# Patient Record
Sex: MALE | Race: BLACK OR AFRICAN AMERICAN
[De-identification: names, ages, dates, MRNs, and addresses within clinical notes are randomized per-mention and may not be internally consistent; named-entity substitution may affect disease eponyms.]

---

## 2019-07-14 ENCOUNTER — HOSPITAL ENCOUNTER (EMERGENCY)
Dept: HOSPITAL 62 - ER | Age: 29
LOS: 1 days | Discharge: HOME | End: 2019-07-15
Payer: SELF-PAY

## 2019-07-14 DIAGNOSIS — S21.112A: ICD-10-CM

## 2019-07-14 DIAGNOSIS — S51.812A: Primary | ICD-10-CM

## 2019-07-14 DIAGNOSIS — S61.512A: ICD-10-CM

## 2019-07-14 DIAGNOSIS — S31.114A: ICD-10-CM
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
## 2019-07-14 DIAGNOSIS — S41.112A: ICD-10-CM

## 2019-07-14 PROCEDURE — 12005 RPR S/N/A/GEN/TRK12.6-20.0CM: CPT

## 2019-07-14 PROCEDURE — 99282 EMERGENCY DEPT VISIT SF MDM: CPT

## 2019-07-15 VITALS — DIASTOLIC BLOOD PRESSURE: 58 MMHG | SYSTOLIC BLOOD PRESSURE: 121 MMHG
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
## 2019-07-15 NOTE — ER DOCUMENT REPORT
Procedures





- Laceration/Wound Repair


  ** Distal Left FA


Wound length (cm): 5


Wound's Depth, Shape: Superficial, Irregular


Laceration pre-procedure: Sterile PPE donned


Anesthetic type: 1% Lidocaine


Wound explored: Clean


Wound Debrided: Minimal


Wound Repaired With: Sutures


Suture Size/Type: 4:0


Number of Sutures: 8


Post-procedure NV exam normal: Yes


Complications: No





  ** Mid Left FA


Wound length (cm): 4


Wound's Depth, Shape: Superficial


Laceration pre-procedure: Sterile PPE donned


Anesthetic type: 1% Lidocaine


Wound explored: Clean


Wound Repaired With: Sutures


Suture Size/Type: 4:0


Number of Sutures: 7


Post-procedure NV exam normal: Yes


Complications: No





  ** Upper L Arm


Wound length (cm): 2


Wound's Depth, Shape: Superficial


Laceration pre-procedure: Sterile PPE donned


Anesthetic type: 1% Lidocaine


Wound explored: Clean


Wound Debrided: Minimal


Wound Repaired With: Sutures


Suture Size/Type: 4:0


Number of Sutures: 2


Post-procedure NV exam normal: Yes


Complications: No





  ** Left abdomen


Wound length (cm): 4


Wound's Depth, Shape: Superficial


Laceration pre-procedure: Sterile PPE donned


Anesthetic type: 1% Lidocaine


Wound explored: Clean


Wound Debrided: Minimal


Wound Repaired With: Sutures


Suture Size/Type: 4:0


Number of Sutures: 4


Post-procedure NV exam normal: Yes


Complications: No





  ** Misc Mid L arm lacerations x3


Wound length (cm): 3


Wound's Depth, Shape: Superficial


Laceration pre-procedure: Sterile PPE donned


Anesthetic type: 1% Lidocaine


Wound Repaired With: Dermabond


Complications: No

## 2019-07-23 ENCOUNTER — HOSPITAL ENCOUNTER (EMERGENCY)
Dept: HOSPITAL 62 - ER | Age: 29
Discharge: HOME | End: 2019-07-23
Payer: SELF-PAY

## 2019-07-23 VITALS — SYSTOLIC BLOOD PRESSURE: 132 MMHG | DIASTOLIC BLOOD PRESSURE: 77 MMHG

## 2019-07-23 DIAGNOSIS — L08.9: Primary | ICD-10-CM
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
## 2019-07-23 DIAGNOSIS — S51.812A: ICD-10-CM

## 2019-07-23 PROCEDURE — 99282 EMERGENCY DEPT VISIT SF MDM: CPT
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
## 2019-07-28 ENCOUNTER — HOSPITAL ENCOUNTER (EMERGENCY)
Dept: HOSPITAL 62 - ER | Age: 29
Discharge: HOME | End: 2019-07-28
Payer: SELF-PAY

## 2019-07-28 VITALS — SYSTOLIC BLOOD PRESSURE: 128 MMHG | DIASTOLIC BLOOD PRESSURE: 96 MMHG

## 2019-07-28 DIAGNOSIS — M53.3: Primary | ICD-10-CM

## 2019-07-28 DIAGNOSIS — M79.672: ICD-10-CM
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
## 2019-07-28 PROCEDURE — 72220 X-RAY EXAM SACRUM TAILBONE: CPT

## 2019-07-28 PROCEDURE — 99283 EMERGENCY DEPT VISIT LOW MDM: CPT

## 2019-07-28 NOTE — RADIOLOGY REPORT (SQ)
EXAM DESCRIPTION:  SACRUM AND COCCYX



COMPLETED DATE/TIME:  7/28/2019 10:52 am



REASON FOR STUDY:  pain/fall



COMPARISON:  None.



NUMBER OF VIEWS:  Three views.



TECHNIQUE:  AP, lateral, and tilt views of the sacrum and coccyx.



LIMITATIONS:  None.



FINDINGS:  MINERALIZATION: Normal.

BONES: No acute fracture or dislocation.  No worrisome bone lesions.

SOFT TISSUES: No soft tissue swelling.  No foreign body.

OTHER: No other significant finding.



IMPRESSION:  NEGATIVE STUDY OF THE SACRUM AND COCCYX.



TECHNICAL DOCUMENTATION:  JOB ID:  [id number]

 2011 [employer]- All Rights Reserved



Reading location - IP/workstation name: [email or web address]

## 2019-07-28 NOTE — ER DOCUMENT REPORT
HPI





- HPI


Patient complains to provider of: foot and buttocks pain


Time Seen by Provider: 07/28/19 10:15


Pain Level: 5


Context: 





Patient is otherwise healthy 29-year-old male presents to the emergency 

department for left foot pain as well as coccyx pain.  Patient states yesterday 

evening he actually tripped and fell down 2 stairs.  States he landed on the 

bottom of his left heel and on his buttocks.  Patient's denying hitting his 

head, neck, upper back.  Patient's denying any loss of consciousness or 

vomiting.  Patient is denying any numbness or tingling in any extremity.  

Patient's denying any loss of bowel or bladder or urinary retention.





Patient states his pain is mostly located in the heel of his left foot and right

on his tailbone.


Patient denies any pain in the lateral or medial left malleolus.





- CONSTITUTIONAL


Constitutional: DENIES: Fever, Chills





- MUSCULOSKELETAL


Musculoskeletal: REPORTS: Extremity pain - left ankle





Past Medical History





- General


Information source: Patient





- Social History


Smoking Status: Never Smoker


Chew tobacco use (# tins/day): No


Frequency of alcohol use: None


Drug Abuse: None


Family History: Reviewed & Not Pertinent


Patient has suicidal ideation: No


Patient has homicidal ideation: No


Renal/ Medical History: Denies: Hx Peritoneal Dialysis





Vertical Provider Document





- CONSTITUTIONAL


Agree With Documented VS: Yes


Notes: 





GENERAL: Alert, interacts well. No acute distress.


HEAD: Normocephalic, atraumatic.


EYES: Pupils equal, round, and reactive to light. Extraocular movements intact.


ENT: Oral mucosa moist, tongue midline. 


NECK: Full range of motion. Supple. Trachea midline.


LUNGS: Clear to auscultation bilaterally, no wheezes, rales, or rhonchi. No 

respiratory distress.


HEART: Regular rate and rhythm. No murmur


ABDOMEN: Soft, non-tender. Non-distended. Bowel sounds present in all 4 

quadrants.


EXTREMITIES: Moves all 4 extremities spontaneously. No edema, normal radial and 

dorsalis pedis pulses bilaterally. No cyanosis.  Generalized pains noted in the 

left heel, no erythema, no ecchymosis, no swelling noted.  Full range of motion 

left ankle, no left medial or lateral malleolus pain.  No left knee pain, no 

left hip pain. 


BACK: no cervical, thoracic, lumbar midline tenderness. No saddle anesthesia, 

normal distal neurovascular exam.  Generalized pain the patient's coccyx region.

 No erythema, no swelling, no ecchymosis noted.


NEUROLOGICAL: Alert and oriented x3. Normal speech. cranial nerves II through 

XII grossly intact 


PSYCH: Normal affect, normal mood.


SKIN: Warm, dry, normal turgor. No rashes or lesions noted.








- INFECTION CONTROL


TRAVEL OUTSIDE OF THE [place] IN LAST 30 DAYS: No





Course





- Re-evaluation


Re-evalutation: 





07/28/19 11:10


                                        





Foot X-Ray  07/28/19 10:17


IMPRESSION:  NEGATIVE STUDY OF THE LEFT FOOT. NO RADIOGRAPHIC EVIDENCE OF ACUTE 

INJURY.


 








Sacrum and Coccyx X-Ray  07/28/19 10:17


IMPRESSION:  NEGATIVE STUDY OF THE SACRUM AND COCCYX.


 








Discussed with patient these results at bedside.  Discussed use of crutches and 

Ace wrap for pain in right heel.  Discussed close follow-up with primary care 

provider and orthopedics as needed.





At this time will discharge with return precautions and follow-up 

recommendations.  Verbal discharge instructions given a the bedside and 

opportunity for questions given. Medication warnings reviewed. Patient is in 

agreement with this plan and has verbalized understanding of return precautions 

and the need for primary care follow-up in the next 24-72 hours.





This medical record was dictated with voice recognizing software.  There may be 

grammatical, syntax errors that are unintended.





- Vital Signs


Vital signs: 


                                        











Temp Pulse Resp BP Pulse Ox


 


 98.5 F   78   16   128/96 H  98 


 


 07/28/19 09:53  07/28/19 09:53  07/28/19 09:53  07/28/19 09:53  07/28/19 09:53














Discharge





- Discharge


Clinical Impression: 


 Coccyx pain





Heel pain


Qualifiers:


 Laterality: left Qualified Code(s): M79.672 - Pain in left foot





Condition: Stable


Disposition: HOME, SELF-CARE


Instructions:  Coccyx Injury ([facility])


Additional Instructions: 


As we discussed you have been seen and treated in the emergency department for 

an injury to your left foot and heel Kocsis bone.  Your x-rays revealed no signs

of broken bones.  Please use Ace wrap and crutches as needed.  Please also take 

over-the-counter Tylenol or Motrin for generalized pain.  Please follow-up with 

your primary care provider in the next 24 to 48 hours.


Please also follow-up with orthopedics, phone numbers will be provided in this 

packet.  Please return to the emergency room for any further concerns.


Forms:  Return to Work


Referrals: 


LAWANDA MOLINA MD [ACTIVE STAFF] - Follow up as needed

## 2019-07-28 NOTE — RADIOLOGY REPORT (SQ)
EXAM DESCRIPTION:  FOOT LEFT COMPLETE



COMPLETED DATE/TIME:  7/28/2019 10:52 am



REASON FOR STUDY:  pain



COMPARISON:  None.



NUMBER OF VIEWS:  Three views.



TECHNIQUE:  AP, lateral and oblique  radiographic images acquired of the left foot.



LIMITATIONS:  None.



FINDINGS:  MINERALIZATION: Normal.

BONES: No acute fracture or dislocation.  No worrisome bone lesions.

JOINTS: No effusions.

SOFT TISSUES: No soft tissue swelling.  No foreign body.

OTHER: No other significant finding.



IMPRESSION:  NEGATIVE STUDY OF THE LEFT FOOT. NO RADIOGRAPHIC EVIDENCE OF ACUTE INJURY.



TECHNICAL DOCUMENTATION:  JOB ID:  [id number]

 2011 [employer]- All Rights Reserved



Reading location - IP/workstation name: [email or web address]